# Patient Record
(demographics unavailable — no encounter records)

---

## 2025-02-13 NOTE — HISTORY OF PRESENT ILLNESS
[FreeTextEntry1] : 66 yo male here for annual PE. Goes to [place] once a year for h/o lymphoma. Feels well. Does c/po some frequent urination at night.

## 2025-02-13 NOTE — HEALTH RISK ASSESSMENT
[Good] : ~his/her~  mood as  good [Yes] : Yes [2 - 4 times a month (2 pts)] : 2-4 times a month (2 points) [No falls in past year] : Patient reported no falls in the past year [PHQ-2 Negative - No further assessment needed] : PHQ-2 Negative - No further assessment needed [Time Spent: ___ Minutes] : I spent [unfilled] minutes performing a depression screening for this patient. [Never] : Never [None] : None [With Significant Other] : lives with significant other [Employed] : employed [] :  [Fully functional (bathing, dressing, toileting, transferring, walking, feeding)] : Fully functional (bathing, dressing, toileting, transferring, walking, feeding) [Fully functional (using the telephone, shopping, preparing meals, housekeeping, doing laundry, using] : Fully functional and needs no help or supervision to perform IADLs (using the telephone, shopping, preparing meals, housekeeping, doing laundry, using transportation, managing medications and managing finances) [Reviewed no changes] : Reviewed, no changes [Designated Healthcare Proxy] : Designated healthcare proxy [Relationship: ___] : Relationship: [unfilled] [Change in mental status noted] : No change in mental status noted [Reports changes in hearing] : Reports no changes in hearing [Reports changes in vision] : Reports no changes in vision [BoneDensityDate] : 10/22 [ColonoscopyDate] : 12/22

## 2025-02-13 NOTE — PLAN
[FreeTextEntry1] : Bloodwork done at [place]- results reviewed with pt- CBC, CMP, Lipids, Vit D, TSH- all stable.  medication reconciliation performed- pt not on opoids advised healthy diet/exercise discussed vaccines- had covid and flu vaccines 10/24. Last Prevnar 2/24.. Had RSV 11/24. Had Shingrix 2022.  Depression screen done & reviewed 5 minutes  h/o lymphoma- goes to [place]. Gets regular testing- Carotid 12/24, echo 6/24.  Frequent urination at night- check PSA